# Patient Record
Sex: MALE | Race: WHITE | NOT HISPANIC OR LATINO | Employment: UNEMPLOYED | URBAN - NONMETROPOLITAN AREA
[De-identification: names, ages, dates, MRNs, and addresses within clinical notes are randomized per-mention and may not be internally consistent; named-entity substitution may affect disease eponyms.]

---

## 2018-11-19 RX ORDER — LISINOPRIL 10 MG/1
10 TABLET ORAL DAILY
COMMUNITY
End: 2019-01-21 | Stop reason: ALTCHOICE

## 2019-01-21 RX ORDER — LISINOPRIL 40 MG/1
40 TABLET ORAL DAILY
COMMUNITY
Start: 2018-12-05

## 2019-01-23 ENCOUNTER — OFFICE VISIT (OUTPATIENT)
Dept: GASTROENTEROLOGY | Facility: CLINIC | Age: 59
End: 2019-01-23

## 2019-01-23 VITALS
RESPIRATION RATE: 18 BRPM | WEIGHT: 201 LBS | HEART RATE: 98 BPM | HEIGHT: 70 IN | TEMPERATURE: 97.4 F | DIASTOLIC BLOOD PRESSURE: 97 MMHG | SYSTOLIC BLOOD PRESSURE: 137 MMHG | BODY MASS INDEX: 28.77 KG/M2

## 2019-01-23 DIAGNOSIS — R12 HEARTBURN: Primary | ICD-10-CM

## 2019-01-23 DIAGNOSIS — Z12.11 COLON CANCER SCREENING: ICD-10-CM

## 2019-01-23 PROCEDURE — 99203 OFFICE O/P NEW LOW 30 MIN: CPT | Performed by: INTERNAL MEDICINE

## 2019-01-23 RX ORDER — TRIAMCINOLONE ACETONIDE 0.25 MG/G
CREAM TOPICAL 2 TIMES DAILY
COMMUNITY

## 2019-01-23 NOTE — PATIENT INSTRUCTIONS
1. Anti-reflux measures.  2. Counseling smoking symptomatic  3. The patient should cut down on alcohol.  4. Nexium capsule (esomeprazole) 20 mg. Take one capsule orally in the morning half an hour before eating daily.  5. Upper endoscopy (EGD) counseling:  Description of the procedure, risks, benefits, alternatives and options including nonoperative options were discussed with the patient in detail.  The patient understands and wishes to proceed.  6. A possible colonoscopy is recommended in the future.

## 2019-01-23 NOTE — PROGRESS NOTES
Chief Complaint   Patient presents with   • Heartburn     History of Present Illness     The patient has history of recurrent reflux for the last 20 years or so. Reflux is described as moderately severe retrosternal burning sensation, and indigestion. There is history of occasional regurgitative symptoms. Frequency being 2-4 times a week. The patient was recently seen by dental services and was found to have dental issues that could have been caused by acid reflux. The patient has been started on Nexium 20 mg over-the-counter with improvement of his symptoms. There is no dysphagia or odynophagia. The patient denies chest pains. There are no voice changes.    The patient denies nausea or vomiting. There is no diarrhea or constipation.There is no history of overt GI bleed (Hematemesis, melena or hematochezia). There is no history of liver or pancreatic disease. There is no family history of colon cancer, inflammatory bowel disease or chronic liver disease. There is no family history of esophageal or gastric CA.    The patient is a smoker. He also drinks couple of drinks a week. The patient denies recent significant weight gain or weight loss. No previous colonoscopy.     Review of Systems   Constitutional: Negative for appetite change, chills, fatigue, fever and unexpected weight change.   HENT: Negative for mouth sores, nosebleeds and trouble swallowing.    Eyes: Negative for discharge and redness.   Respiratory: Negative for apnea, cough and shortness of breath.    Cardiovascular: Negative for chest pain, palpitations and leg swelling.   Gastrointestinal: Negative for abdominal distention, abdominal pain, anal bleeding, blood in stool, constipation, diarrhea, nausea and vomiting.   Endocrine: Negative for cold intolerance, heat intolerance and polydipsia.   Genitourinary: Negative for dysuria, hematuria and urgency.   Musculoskeletal: Negative for arthralgias, joint swelling and myalgias.   Skin: Negative for rash.    Allergic/Immunologic: Negative for food allergies and immunocompromised state.   Neurological: Negative for dizziness, seizures, syncope and headaches.   Hematological: Negative for adenopathy. Does not bruise/bleed easily.   Psychiatric/Behavioral: Negative for dysphoric mood. The patient is not nervous/anxious and is not hyperactive.      There is no problem list on file for this patient.    Past Medical History:   Diagnosis Date   • Acid reflux    • Allergic rhinitis    • Back pain     20 years ago    • H/O angioplasty 12/2013   • Heart attack (CMS/HCC) 12/2013   • High blood pressure     15 years ago    • Lumbar sprain    • MI (myocardial infarction) (CMS/HCC)    • Myocardial infarction (CMS/HCC)    • Rhus dermatitis      Past Surgical History:   Procedure Laterality Date   • ANGIOPLASTY     • VASECTOMY     • VASECTOMY       Family History   Problem Relation Age of Onset   • Depression Mother    • Hyperlipidemia Mother    • Hypertension Mother    • Hyperlipidemia Father    • Hypertension Father    • Hyperlipidemia Sister    • Hypertension Sister    • Diabetes Sister    • Hypertension Maternal Grandmother    • Colon cancer Neg Hx    • Colon polyps Neg Hx    • Rectal cancer Neg Hx    • Liver cancer Neg Hx    • Stomach cancer Neg Hx    • Alcohol abuse Neg Hx      Social History     Tobacco Use   • Smoking status: Current Every Day Smoker     Packs/day: 1.00     Types: Cigarettes   • Smokeless tobacco: Never Used   Substance Use Topics   • Alcohol use: Yes     Comment: 7 or less per week         Current Outpatient Medications:   •  ASPIRIN PO, Take  by mouth., Disp: , Rfl:   •  Esomeprazole Magnesium (NEXIUM PO), Take  by mouth., Disp: , Rfl:   •  lisinopril (PRINIVIL,ZESTRIL) 40 MG tablet, Take 40 mg by mouth Daily., Disp: , Rfl:   •  triamcinolone (KENALOG) 0.025 % cream, Apply  topically to the appropriate area as directed 2 (Two) Times a Day., Disp: , Rfl:     No Known Allergies    Blood pressure 137/97, pulse  "98, temperature 97.4 °F (36.3 °C), resp. rate 18, height 177.8 cm (70\"), weight 91.2 kg (201 lb).    Physical Exam   Constitutional: He is oriented to person, place, and time. He appears well-developed and well-nourished. No distress.   HENT:   Head: Normocephalic and atraumatic.   Right Ear: Hearing and external ear normal.   Left Ear: Hearing and external ear normal.   Nose: Nose normal.   Mouth/Throat: Oropharynx is clear and moist and mucous membranes are normal. Mucous membranes are not pale, not dry and not cyanotic. No oral lesions. No oropharyngeal exudate.   Eyes: Conjunctivae and EOM are normal. Right eye exhibits no discharge. Left eye exhibits no discharge. No scleral icterus.   Neck: Trachea normal. Neck supple. No JVD present. No edema present. No thyroid mass and no thyromegaly present.   Cardiovascular: Normal rate, regular rhythm, S2 normal and normal heart sounds. Exam reveals no gallop, no S3 and no friction rub.   No murmur heard.  Pulmonary/Chest: Effort normal and breath sounds normal. No respiratory distress. He has no wheezes. He has no rales. He exhibits no tenderness.   Abdominal: Soft. Normal appearance and bowel sounds are normal. He exhibits no distension, no ascites and no mass. There is no splenomegaly or hepatomegaly. There is no tenderness. There is no rigidity, no rebound and no guarding. No hernia.   Musculoskeletal: He exhibits no tenderness or deformity.     Vascular Status -  His right foot exhibits no edema. His left foot exhibits no edema.  Lymphadenopathy:     He has no cervical adenopathy.        Left: No supraclavicular adenopathy present.   Neurological: He is alert and oriented to person, place, and time. He has normal strength. No cranial nerve deficit or sensory deficit. He exhibits normal muscle tone. Coordination normal.   Skin: No rash noted. He is not diaphoretic. No cyanosis. No pallor. Nails show no clubbing.   Psychiatric: He has a normal mood and affect. His " behavior is normal. Judgment and thought content normal.   Nursing note and vitals reviewed.  Stigmata of chronic liver disease:  None.  Asterixis:  None.    Laboratory Testing:  Upon review of medical records:    Dated January 15, 2015 total cholesterol 217.  Triglycerides 269.    Assessment:      ICD-10-CM ICD-9-CM   1. Heartburn R12 787.1   2. Colon cancer screening Z12.11 V76.51         Discussion:  1.     Plan/  Patient Instructions   1. Anti-reflux measures.  2. Counseling smoking symptomatic  3. The patient should cut down on alcohol.  4. Nexium capsule (esomeprazole) 20 mg. Take one capsule orally in the morning half an hour before eating daily.  5. Upper endoscopy (EGD) counseling:  Description of the procedure, risks, benefits, alternatives and options including nonoperative options were discussed with the patient in detail.  The patient understands and wishes to proceed.  6. A possible colonoscopy is recommended in the future.       Nile Higuera MD

## 2019-01-30 ENCOUNTER — PREP FOR SURGERY (OUTPATIENT)
Dept: OTHER | Facility: HOSPITAL | Age: 59
End: 2019-01-30

## 2019-01-30 DIAGNOSIS — R12 HEARTBURN: Primary | ICD-10-CM

## 2019-01-30 RX ORDER — SODIUM CHLORIDE 9 MG/ML
70 INJECTION, SOLUTION INTRAVENOUS CONTINUOUS PRN
Status: CANCELLED | OUTPATIENT
Start: 2019-01-30

## 2019-02-04 PROBLEM — R12 HEARTBURN: Status: ACTIVE | Noted: 2019-02-04

## 2019-03-21 ENCOUNTER — TELEPHONE (OUTPATIENT)
Dept: GASTROENTEROLOGY | Facility: CLINIC | Age: 59
End: 2019-03-21

## 2019-03-21 NOTE — TELEPHONE ENCOUNTER
Patient left a message needing to R/S procedure for 03/21.  Called patient and left VM for patient to call back to R/S.